# Patient Record
Sex: FEMALE | Race: WHITE | Employment: FULL TIME | URBAN - METROPOLITAN AREA
[De-identification: names, ages, dates, MRNs, and addresses within clinical notes are randomized per-mention and may not be internally consistent; named-entity substitution may affect disease eponyms.]

---

## 2017-06-18 ENCOUNTER — OFFICE VISIT (OUTPATIENT)
Dept: FAMILY MEDICINE CLINIC | Facility: CLINIC | Age: 37
End: 2017-06-18

## 2017-06-18 VITALS
TEMPERATURE: 99 F | DIASTOLIC BLOOD PRESSURE: 74 MMHG | HEART RATE: 107 BPM | RESPIRATION RATE: 16 BRPM | SYSTOLIC BLOOD PRESSURE: 132 MMHG | OXYGEN SATURATION: 99 %

## 2017-06-18 DIAGNOSIS — J02.0 STREP PHARYNGITIS: Primary | ICD-10-CM

## 2017-06-18 DIAGNOSIS — J02.9 PHARYNGITIS, UNSPECIFIED ETIOLOGY: ICD-10-CM

## 2017-06-18 PROCEDURE — 99203 OFFICE O/P NEW LOW 30 MIN: CPT | Performed by: NURSE PRACTITIONER

## 2017-06-18 PROCEDURE — 87880 STREP A ASSAY W/OPTIC: CPT | Performed by: NURSE PRACTITIONER

## 2017-06-18 RX ORDER — AMOXICILLIN 500 MG/1
500 CAPSULE ORAL 2 TIMES DAILY
Qty: 20 CAPSULE | Refills: 0 | Status: SHIPPED | OUTPATIENT
Start: 2017-06-18 | End: 2017-06-28

## 2017-06-18 NOTE — PROGRESS NOTES
CHIEF COMPLAINT:   No chief complaint on file. HPI:   Ellie Duenas is a 30year old female presents to clinic with symptoms of sore throat. Patient has had for 1 days. Symptoms have been progressing since onset.   Patient reports following associated sy Recent Results (from the past 24 hour(s))  -STREP A ASSAY W/OPTIC   Collection Time: 06/18/17 10:45 AM   Result Value Ref Range   STREP GRP A CUL-SCR POSITIVE Negative   Control Line Present with a clear background (yes/no) YES Yes/No   Kit Lot # [id number] · The antibiotic medication must be taken for the full 10 days, even if you feel better.  This is very important to ensure the infection is treated. It is also important to prevent drug-resistent organisms from developing. If you were given an antibiotic sh The patient/parent indicates understanding of these issues and agrees to the plan. The patient is asked to follow up with their PCP prn.

## 2017-06-19 ENCOUNTER — TELEPHONE (OUTPATIENT)
Dept: FAMILY MEDICINE CLINIC | Facility: CLINIC | Age: 37
End: 2017-06-19

## 2017-06-19 RX ORDER — AMOXICILLIN 500 MG/1
500 CAPSULE ORAL 2 TIMES DAILY
Qty: 4 CAPSULE | Refills: 0 | Status: SHIPPED | OUTPATIENT
Start: 2017-06-19 | End: 2017-06-21

## 2017-06-19 NOTE — TELEPHONE ENCOUNTER
Patient is on vacation in [place] for 2 days. She forgot her antibiotic at home. Is requesting to have 2 days of antibiotic prescribed till she can get home. Will send prescription over to [facility] in [place].   Advised patient due to state li

## 2018-02-03 ENCOUNTER — OFFICE VISIT (OUTPATIENT)
Dept: FAMILY MEDICINE CLINIC | Facility: CLINIC | Age: 38
End: 2018-02-03

## 2018-02-03 VITALS
SYSTOLIC BLOOD PRESSURE: 130 MMHG | HEART RATE: 106 BPM | HEIGHT: 67 IN | BODY MASS INDEX: 25.11 KG/M2 | WEIGHT: 160 LBS | DIASTOLIC BLOOD PRESSURE: 84 MMHG | OXYGEN SATURATION: 98 % | RESPIRATION RATE: 16 BRPM | TEMPERATURE: 100 F

## 2018-02-03 DIAGNOSIS — Z72.0 TOBACCO USE: ICD-10-CM

## 2018-02-03 DIAGNOSIS — R06.2 WHEEZING: ICD-10-CM

## 2018-02-03 DIAGNOSIS — J02.0 STREP PHARYNGITIS: Primary | ICD-10-CM

## 2018-02-03 DIAGNOSIS — J40 BRONCHITIS: ICD-10-CM

## 2018-02-03 DIAGNOSIS — J04.0 LARYNGITIS: ICD-10-CM

## 2018-02-03 LAB
CONTROL LINE PRESENT WITH A CLEAR BACKGROUND (YES/NO): YES YES/NO
STREP GRP A CUL-SCR: POSITIVE

## 2018-02-03 PROCEDURE — 94640 AIRWAY INHALATION TREATMENT: CPT | Performed by: NURSE PRACTITIONER

## 2018-02-03 PROCEDURE — 87880 STREP A ASSAY W/OPTIC: CPT | Performed by: NURSE PRACTITIONER

## 2018-02-03 PROCEDURE — 99213 OFFICE O/P EST LOW 20 MIN: CPT | Performed by: NURSE PRACTITIONER

## 2018-02-03 RX ORDER — ALBUTEROL SULFATE 2.5 MG/3ML
2.5 SOLUTION RESPIRATORY (INHALATION) ONCE
Status: COMPLETED | OUTPATIENT
Start: 2018-02-03 | End: 2018-02-03

## 2018-02-03 RX ORDER — AZITHROMYCIN 250 MG/1
TABLET, FILM COATED ORAL
Qty: 6 TABLET | Refills: 0 | Status: SHIPPED | OUTPATIENT
Start: 2018-02-03 | End: 2021-08-02

## 2018-02-03 RX ORDER — ALBUTEROL SULFATE 90 UG/1
2 AEROSOL, METERED RESPIRATORY (INHALATION) EVERY 6 HOURS PRN
Qty: 1 INHALER | Refills: 0 | Status: SHIPPED | OUTPATIENT
Start: 2018-02-03 | End: 2021-08-02

## 2018-02-03 RX ADMIN — ALBUTEROL SULFATE 2.5 MG: 2.5 SOLUTION RESPIRATORY (INHALATION) at 14:51:00

## 2018-02-03 NOTE — PATIENT INSTRUCTIONS
· You are considered to be contagious until you have been on antibiotics for 24 hours. · You can return to school and/or work once on antibiotics for 24 hours. · Can use over the counter Tylenol/Ibuprofen as needed.   · Push fluids- warm or cool liquids · You may use acetaminophen or ibuprofen to control pain or fever, unless another medicine was prescribed for this. Talk with your healthcare provider before taking these medicines if you have chronic liver or kidney disease.  Also talk with your healthcare Bronchitis is an infection of the air passages. It often occurs during a cold and is usually caused by a virus. Symptoms include cough with mucus (phlegm) and low-grade fever.  This illness is contagious during the first few days and is spread through the a · Over-the-counter cough, cold, and sore-throat medicines will not shorten the length of the illness, but they may be helpful to reduce symptoms.  (Note: Do not use decongestants if you have high blood pressure.)  · If you were given an inhaler, use it exac Laryngitis is a swelling of the tissues around the vocal cords. Symptoms include a hoarse (scratchy) voice. The voice may be lost completely. It may be caused by a viral illness, such as a head or chest cold.  It may also be due to overuse and strain of the Take this medicine by mouth with a full glass of water. Follow the directions on the prescription label. The tablets can be taken with food or on an empty stomach. If the medicine upsets your stomach, take it with food.  Take your medicine at regular interv If you miss a dose, take it as soon as you can. If it is almost time for your next dose, take only that dose. Do not take double or extra doses. Where should I keep my medicine? Keep out of the reach of children.   Store at room temperature between 15 and This medicine is for inhalation through the mouth. Follow the directions on your prescription label. Take your medicine at regular intervals. Do not use more often than directed. Make sure that you are using your inhaler correctly.  Ask you doctor or health Store at room temperature between 15 and 30 degrees C (59 and 86 degrees F). The contents are under pressure and may burst when exposed to heat or flame. Do not freeze. This medicine does not work as well if it is too cold.  Throw away any unused medicine a 1. Keep your inhaler at room temperature. 2. Hold the inhaler so that the part that goes into your mouth is at the bottom. 3. Shake the inhaler well and remove the cap. 4. Breathe out through your mouth to fully empty your lungs.   5. Place the inhaler i Remove the metal canister and do not immerse it in water. Then clean the plastic mouthpiece, cap, and spacer if you have one, by rinsing them well in warm running water for 30 to 60 seconds.  Shake off excess water and allow the mouthpiece to dry completely

## 2018-02-03 NOTE — PROGRESS NOTES
CHIEF COMPLAINT:   Patient presents with:  Pharyngitis: X 3 days, low grade fever      HPI:   Smitha Botello is a 30year old female presents to clinic with symptoms of sore throat. Patient has had for 3 days. Symptoms have been worsened since onset.   Donnie /84   Pulse 106   Temp 99.5 °F (37.5 °C) (Oral)   Resp 16   Ht 67\"   Wt 160 lb   LMP 01/20/2018 (Exact Date)   SpO2 98%   Breastfeeding?  No   BMI 25.06 kg/m²   GENERAL: well developed, well nourished,in no apparent distress  SKIN: no rashes,no suspi Sig: Take two tablets by mouth on day one once, then on days 2 through 5 take one tablet by mouth daily      Albuterol Sulfate  (90 Base) MCG/ACT Inhalation Aero Soln 1 Inhaler 0      Sig: Inhale 2 puffs into the lungs every 6 (six) hours as need Pharyngitis: Strep (Confirmed)    You have had a positive test for strep throat. Strep throat is a contagious illness. It is spread by coughing, kissing or by touching others after touching your mouth or nose.  Symptoms include throat pain that is worse wit · Lymph nodes getting larger or becoming soft in the middle  · You can't swallow liquids or you can't open your mouth wide because of throat pain  · Signs of dehydration. These include very dark urine or no urine, sunken eyes, and dizziness.   · Trouble ursula · Do not smoke. Also avoid being exposed to secondhand smoke. · You may use over-the-counter medicine to control fever or pain, unless another medicine was prescribed.  Note: If you have chronic liver or kidney disease or have ever had a stomach ulcer or g · Fever of 100.4°F (38°C) or higher, or as directed by your healthcare provider  · Coughing up increasing amounts of colored sputum  · Weakness, drowsiness, headache, facial pain, ear pain, or a stiff neck  Call 911  Call 911 if any of these occur.   · [employer] © 3181-0944 [employer] [address]. [facility], [facility] [place]. All rights reserved. This information is not intended as a substitute for professional medical care. Always follow your healthcare professional's instructions.         Gabrielle · dizziness, drowsiness  · headache  · stomach upset or vomiting  · tooth discoloration  · vaginal irritation  What may interact with this medicine?   Do not take this medicine with any of the following medications:  · lincomycin  This medicine may also int Brand Names: Proair HFA, Proventil, Proventil HFA, Respirol, Ventolin, Ventolin HFA  What is this medicine? ALBUTEROL (nevin Paredes) is a bronchodilator. It helps open up the airways in your lungs to make it easier to breathe.  This medicine is used to · steroid hormones like dexamethasone, cortisone, hydrocortisone  · theophylline  · thyroid hormones  What if I miss a dose? If you miss a dose, use it as soon as you can. If it is almost time for your next dose, use only that dose.  Do not use double or e The inhaler that you were prescribed contains a potent medicine. It should only be used as directed. The medicine in your inhaler must be breathed deeply into your lungs for it to work. It will not work at all if it only reaches your mouth and throat.  [address]. A special chamber (“spacer”) may be prescribed that attaches to your inhaler. This increases the amount of medicine that goes to your lungs. It also improves how well each treatment works. Ask your doctor about this if you did not receive one.     Keep Follow up in 3-5 days if not improving, condition worsens, or fever greater than or equal to 100.4 persists for 72 hours. The patient/parent indicates understanding of these issues and agrees to the plan.   The patient is asked to follow up w

## 2021-06-21 ENCOUNTER — HOSPITAL ENCOUNTER (EMERGENCY)
Facility: HOSPITAL | Age: 41
Discharge: HOME | End: 2021-06-21
Attending: EMERGENCY MEDICINE
Payer: COMMERCIAL

## 2021-06-21 VITALS
OXYGEN SATURATION: 96 % | HEIGHT: 67 IN | TEMPERATURE: 97 F | BODY MASS INDEX: 27.78 KG/M2 | SYSTOLIC BLOOD PRESSURE: 125 MMHG | HEART RATE: 72 BPM | RESPIRATION RATE: 16 BRPM | WEIGHT: 177 LBS | DIASTOLIC BLOOD PRESSURE: 67 MMHG

## 2021-06-21 DIAGNOSIS — S30.1XXA CONTUSION OF ABDOMINAL WALL, INITIAL ENCOUNTER: Primary | ICD-10-CM

## 2021-06-21 PROCEDURE — 81001 URINALYSIS AUTO W/SCOPE: CPT | Performed by: EMERGENCY MEDICINE

## 2021-06-21 PROCEDURE — 87086 URINE CULTURE/COLONY COUNT: CPT | Performed by: EMERGENCY MEDICINE

## 2021-06-21 PROCEDURE — 99284 EMERGENCY DEPT VISIT MOD MDM: CPT

## 2021-06-21 RX ORDER — CEPHALEXIN 500 MG/1
500 CAPSULE ORAL 2 TIMES DAILY
Qty: 14 CAPSULE | Refills: 0 | Status: SHIPPED | OUTPATIENT
Start: 2021-06-21 | End: 2021-06-28
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
## 2021-08-02 ENCOUNTER — APPOINTMENT (OUTPATIENT)
Dept: GENERAL RADIOLOGY | Age: 41
End: 2021-08-02
Attending: NURSE PRACTITIONER
Payer: COMMERCIAL

## 2021-08-02 ENCOUNTER — HOSPITAL ENCOUNTER (OUTPATIENT)
Age: 41
Discharge: HOME | End: 2021-08-02
Payer: COMMERCIAL

## 2021-08-02 ENCOUNTER — OFFICE VISIT (OUTPATIENT)
Dept: FAMILY MEDICINE CLINIC | Facility: CLINIC | Age: 41
End: 2021-08-02
Payer: COMMERCIAL

## 2021-08-02 VITALS
RESPIRATION RATE: 16 BRPM | OXYGEN SATURATION: 99 % | HEIGHT: 67 IN | SYSTOLIC BLOOD PRESSURE: 125 MMHG | HEART RATE: 90 BPM | DIASTOLIC BLOOD PRESSURE: 84 MMHG | TEMPERATURE: 98 F | BODY MASS INDEX: 26.68 KG/M2 | WEIGHT: 170 LBS

## 2021-08-02 VITALS
RESPIRATION RATE: 16 BRPM | WEIGHT: 170 LBS | DIASTOLIC BLOOD PRESSURE: 90 MMHG | OXYGEN SATURATION: 97 % | BODY MASS INDEX: 26.68 KG/M2 | HEIGHT: 67 IN | TEMPERATURE: 98 F | HEART RATE: 92 BPM | SYSTOLIC BLOOD PRESSURE: 135 MMHG

## 2021-08-02 DIAGNOSIS — Z02.9 ADMINISTRATIVE ENCOUNTER: Primary | ICD-10-CM

## 2021-08-02 DIAGNOSIS — J02.0 STREPTOCOCCAL SORE THROAT: ICD-10-CM

## 2021-08-02 DIAGNOSIS — R05.9 COUGH: Primary | ICD-10-CM

## 2021-08-02 LAB — S PYO AG THROAT QL: POSITIVE

## 2021-08-02 PROCEDURE — 3080F DIAST BP >= 90 MM HG: CPT | Performed by: NURSE PRACTITIONER

## 2021-08-02 PROCEDURE — 3008F BODY MASS INDEX DOCD: CPT | Performed by: NURSE PRACTITIONER

## 2021-08-02 PROCEDURE — 99214 OFFICE O/P EST MOD 30 MIN: CPT | Performed by: NURSE PRACTITIONER

## 2021-08-02 PROCEDURE — 87880 STREP A ASSAY W/OPTIC: CPT | Performed by: NURSE PRACTITIONER

## 2021-08-02 PROCEDURE — 3075F SYST BP GE 130 - 139MM HG: CPT | Performed by: NURSE PRACTITIONER

## 2021-08-02 PROCEDURE — 71046 X-RAY EXAM CHEST 2 VIEWS: CPT | Performed by: NURSE PRACTITIONER

## 2021-08-02 RX ORDER — IBUPROFEN 800 MG/1
800 TABLET ORAL EVERY 8 HOURS PRN
Qty: 30 TABLET | Refills: 0 | Status: SHIPPED | OUTPATIENT
Start: 2021-08-02 | End: 2021-08-09

## 2021-08-02 RX ORDER — ESCITALOPRAM OXALATE 5 MG/1
5 TABLET ORAL DAILY
COMMUNITY
Start: 2021-04-08

## 2021-08-02 RX ORDER — PREDNISONE 20 MG/1
60 TABLET ORAL DAILY
Qty: 15 TABLET | Refills: 0 | Status: SHIPPED | OUTPATIENT
Start: 2021-08-02 | End: 2021-08-07

## 2021-08-02 RX ORDER — ALBUTEROL SULFATE 90 UG/1
2 AEROSOL, METERED RESPIRATORY (INHALATION) EVERY 4 HOURS PRN
Qty: 8 G | Refills: 0 | Status: SHIPPED | OUTPATIENT
Start: 2021-08-02 | End: 2021-09-01

## 2021-08-02 RX ORDER — LISINOPRIL 10 MG/1
TABLET ORAL
COMMUNITY
Start: 2021-05-21

## 2021-08-02 RX ORDER — AMOXICILLIN 500 MG/1
500 TABLET, FILM COATED ORAL 2 TIMES DAILY
Qty: 20 TABLET | Refills: 0 | Status: SHIPPED | OUTPATIENT
Start: 2021-08-02 | End: 2021-08-12

## 2021-08-02 NOTE — ED PROVIDER NOTES
Patient Seen in: [facility]      History   Patient presents with:  Cough/URI    Stated Complaint: From [facility]- cough and URI symptoms for one week.  for CXR    HPI/Subjective:   HPI    44yo female arrives to the ic with co uri s/s for 1 week, +co tenderness. Left Ear: Hearing, tympanic membrane, ear canal and external ear normal. No mastoid tenderness. Nose: Congestion and rhinorrhea present.       Mouth/Throat:      Mouth: Mucous membranes are moist.      Pharynx: No oropharyngeal exudate thoracic spine with multilevel spondylosis.    OTHER: Negative.                     Impression   CONCLUSION: No acute cardiopulmonary disease.               Dictated by (CST): Anton Cross MD on 8/02/2021 at 6:00 PM       Finalized by (CST): HAROON Jordan daily for 10 days. , Normal, Disp-20 tablet, R-0    Albuterol Sulfate  (90 Base) MCG/ACT Inhalation Aero Soln  Inhale 2 puffs into the lungs every 4 (four) hours as needed for Wheezing., Normal, Disp-8 g, R-0    predniSONE 20 MG Oral Tab  Take 3 tabl

## 2021-08-02 NOTE — PROGRESS NOTES
Pt presented with \"cold\" for 1 week. Reports cough, nasal congestion, facial pressure. Denies fever, chills, body aches, N/V/D, loss of taste/smell. Denies sob or chest pain. Denies exposure to covid or strep. + covid 10/2020 per patient report.  Not va

## (undated) NOTE — MR AVS SNAPSHOT
[address] [facility] [address]  [place] \[facility] [place] [facility]\"" [address]  [phone]               Thank you for choosing us for your health care visit with Aranza Avalos NP.   We are glad to serve you and happy to provide you with this summary of your visit ulcer or GI bleeding, talk with your doctor before using these medicines.)  · Throat lozenges or sprays (such as Chloraseptic) help reduce pain. Gargling with warm salt water will also reduce throat pain.  Dissolve 1/2 teaspoon of salt in 1 glass of warm wa These medications were sent to [facility], [address] [phone], [address], [place] [address]     Phone:  [phone]    - amoxicillin 500 MG Caps  - Lidocaine Viscous 2 % Soln            [facility] are less likely to develop some chronic diseases than adults who are inactive.      HOW TO GET STARTED: HOW TO STAY MOTIVATED:   Start activities slowly and build up over time Do what you like   Get your heart pumping – brisk walking, biking, swimmin